# Patient Record
Sex: MALE | Race: WHITE
[De-identification: names, ages, dates, MRNs, and addresses within clinical notes are randomized per-mention and may not be internally consistent; named-entity substitution may affect disease eponyms.]

---

## 2018-06-23 ENCOUNTER — HOSPITAL ENCOUNTER (EMERGENCY)
Dept: HOSPITAL 97 - ER | Age: 53
Discharge: HOME | End: 2018-06-23
Payer: COMMERCIAL

## 2018-06-23 DIAGNOSIS — R60.9: Primary | ICD-10-CM

## 2018-06-23 DIAGNOSIS — I10: ICD-10-CM

## 2018-06-23 PROCEDURE — 99281 EMR DPT VST MAYX REQ PHY/QHP: CPT

## 2018-06-23 NOTE — EDPHYS
Physician Documentation                                                                           

 [facility]                                                                

Name: Gunner Jones                                                                             

Age: 52 yrs                                                                                       

Sex: Male                                                                                         

: 1965                                                                                   

MRN: [id number]                                                                                   

Arrival Date: 2018                                                                          

Time: 01:09                                                                                       

Account#: [id number]                                                                            

Bed 13                                                                                            

Private MD: Bharat Cook                                                                         

ED Physician Howie Valerio                                                                       

HPI:                                                                                              

                                                                                             

02:17 This 52 yrs old  Male presents to ER via Ambulatory with complaints of Wound   gs  

      Infection.                                                                                  

02:17 Patient presents to ED for recheck of: surgery  noticed some swelling to wound, also gs  

      concerned about edema lower extremities saw pcp today restarted hctz, no pain in            

      extremities. The affected area is on the lumbar area. Progress: The patient reports         

      decreased. The patient has not experienced similar symptoms in the past.                    

                                                                                                  

Historical:                                                                                       

- Allergies:                                                                                      

 No Known Allergies;                                                                     fc  

- Home Meds:                                                                                      

 allopurinol Oral once daily [Active]; Propecia 1 mg oral tab 1 tab once daily [Active]; fc  

- PMHx:                                                                                           

 Hypertension; Gout;                                                                     fc  

- PSHx:                                                                                           

 back surg; Knee surgery;                                                                fc  

                                                                                                  

- Immunization history:: Last tetanus immunization: up to date.                                   

- Social history:: Smoking status: Patient/guardian denies using tobacco.                         

- Ebola Screening: : Patient negative for fever greater than or equal to 101.5 degrees            

  Fahrenheit, and additional compatible Ebola Virus Disease symptoms Patient denies               

  exposure to infectious person Patient denies travel to an Ebola-affected area in the            

  21 days before illness onset.                                                                   

                                                                                                  

                                                                                                  

ROS:                                                                                              

02:17 All other systems are negative.                                                         gs  

                                                                                                  

Exam:                                                                                             

02:17 Cardiovascular:  Regular rate and rhythm with a normal S1 and S2.  No gallops, murmurs, gs  

      or rubs.  Normal PMI, no JVD.  No pulse deficits. Respiratory:  Lungs have equal breath     

      sounds bilaterally, clear to auscultation and percussion.  No rales, rhonchi or wheezes     

      noted.  No increased work of breathing, no retractions or nasal flaring. Abdomen/GI:        

      Soft, non-tender, with normal bowel sounds.  No distension or tympany.  No guarding or      

      rebound.  No evidence of tenderness throughout. Skin:  Warm, dry with normal turgor.        

      Normal color with no rashes, no lesions, and no evidence of cellulitis.                     

02:17 Constitutional: The patient appears alert, awake.                                           

02:17 Back: incision healed, small sub q fluid collection.                                        

02:17 Musculoskeletal/extremity: Pulses: are normal with no appreciated deficits, DVT Exam:   gs  

      no pain, no tenderness, negative Homans' sign noted on exam, no appreciated bluish          

      discoloration, no erythema, no increased warmth, swelling.                                  

02:17 Skin: Exam negative for cellulitis.                                                         

02:17 Neuro: Exam negative for acute changes.                                                     

02:26 Musculoskeletal/extremity: Extremities: noted in the right leg and left leg: Edema, 2+  gs  

      to the left midcalf, left ankle, right midcalf and right ankle is noted, DVT Exam:          

                                                                                                  

Vital Signs:                                                                                      

01:29  / 87; Pulse 83; Resp 18; Temp 98.0(O); Pulse Ox 97% on R/A; Weight 98.43 kg (R); fc  

      Height 6 ft. 0 in. (182.88 cm) (R); Pain 3/10;                                              

01:29 Body Mass Index 29.43 (98.43 kg, 182.88 cm)                                             fc  

                                                                                                  

MDM:                                                                                              

02:04 Patient medically screened.                                                               

02:17 Data reviewed: vital signs, nurses notes. ED course: discussed back wound had called dr riki coronado pa told him was to be expected. no tenderness medial thigh no pain in calf          

      discuss low probability of dvt has been off diuretics for 2 weeks has restarted. pt         

      says he doesn't want repeat bloodwork or us wants to go home encouraged to keep legs        

      elevated continue hctz.                                                                     

                                                                                                  

Administered Medications:                                                                         

No medications were administered                                                                  

                                                                                                  

                                                                                                  

Disposition:                                                                                      

18 02:25 Discharged to Home. Impression: Edema, unspecified, seroma.                        

- Condition is Stable.                                                                            

- Discharge Instructions: Edema, Seroma.                                                          

                                                                                                  

- Medication Reconciliation Form, Thank You Letter, Antibiotic Education, Prescription            

  Opioid Use form.                                                                                

- Follow up: Bharat Cook MD; When: 2 - 3 days; Reason: Re-evaluation by your                   

  physician. Follow up: Cirstobal Coronado MD; When: 2 - 3 days; Reason: Re-evaluation by             

  your physician.                                                                                 

                                                                                                  

                                                                                                  

                                                                                                  

Signatures:                                                                                       

Sera Molina RN                   RN   fc                                                   

Jennifer Swift RN                        RN   tl2                                                  

Howie Valerio MD MD gs                                                   

                                                                                                  

Corrections: (The following items were deleted from the chart)                                    

02:40 02:25 2018 02:25 Discharged to Home. Impression: Edema, unspecified; seroma.      tl2 

      Condition is Stable. Forms are Medication Reconciliation Form, Thank You Letter,            

      Antibiotic Education, Prescription Opioid Use. Follow up: Bharat Cook; When: 2 - 3         

      days; Reason: Re-evaluation by your physician. Follow up: Cristobal Coronado; When: 2 - 3         

      days; Reason: Re-evaluation by your physician. gs                                           

                                                                                                  

**************************************************************************************************